# Patient Record
Sex: FEMALE | Race: WHITE | Employment: FULL TIME | URBAN - METROPOLITAN AREA
[De-identification: names, ages, dates, MRNs, and addresses within clinical notes are randomized per-mention and may not be internally consistent; named-entity substitution may affect disease eponyms.]

---

## 2019-05-21 ENCOUNTER — HOSPITAL ENCOUNTER (EMERGENCY)
Facility: CLINIC | Age: 34
Discharge: HOME | End: 2019-05-22
Attending: EMERGENCY MEDICINE | Admitting: EMERGENCY MEDICINE
Payer: OTHER MISCELLANEOUS

## 2019-05-21 ENCOUNTER — APPOINTMENT (OUTPATIENT)
Dept: GENERAL RADIOLOGY | Facility: CLINIC | Age: 34
End: 2019-05-21
Attending: NURSE PRACTITIONER
Payer: OTHER MISCELLANEOUS

## 2019-05-21 DIAGNOSIS — S46.912A LEFT SHOULDER STRAIN, INITIAL ENCOUNTER: ICD-10-CM

## 2019-05-21 PROCEDURE — 99283 EMERGENCY DEPT VISIT LOW MDM: CPT

## 2019-05-21 PROCEDURE — 25000132 ZZH RX MED GY IP 250 OP 250 PS 637: Performed by: EMERGENCY MEDICINE

## 2019-05-21 PROCEDURE — 73030 X-RAY EXAM OF SHOULDER: CPT | Mod: LT

## 2019-05-21 RX ORDER — DEXTROAMPHETAMINE SACCHARATE, AMPHETAMINE ASPARTATE, DEXTROAMPHETAMINE SULFATE AND AMPHETAMINE SULFATE 5; 5; 5; 5 MG/1; MG/1; MG/1; MG/1
20 TABLET ORAL 2 TIMES DAILY
COMMUNITY

## 2019-05-21 RX ORDER — CETIRIZINE HYDROCHLORIDE 10 MG/1
10 TABLET ORAL DAILY
COMMUNITY

## 2019-05-21 RX ORDER — IBUPROFEN 600 MG/1
600 TABLET, FILM COATED ORAL ONCE
Status: COMPLETED | OUTPATIENT
Start: 2019-05-21 | End: 2019-05-21

## 2019-05-21 RX ADMIN — IBUPROFEN 600 MG: 600 TABLET ORAL at 23:23

## 2019-05-21 ASSESSMENT — MIFFLIN-ST. JEOR: SCORE: 1595.39

## 2019-05-21 ASSESSMENT — ENCOUNTER SYMPTOMS: ARTHRALGIAS: 1

## 2019-05-21 NOTE — LETTER
May 21, 2019      To Whom It May Concern:      Becky Coello was seen in our Emergency Department today, 05/21/19.  I expect her condition to improve over the next 4 days.  She may return to work on 5/22/19 with light duty for 4 days of no lifting over 10 pounds and no use of her left shoulder (wear sling for 4 days)    Sincerely,        Coco Garcia MD

## 2019-05-21 NOTE — ED AVS SNAPSHOT
Emergency Department  [address] [place] [id number]  Phone:  [phone]  Fax:  [phone]                                    Becky Coello   MRN: [id number]    Department:   Emergency Department   Date of Visit:  5/21/2019           After Visit Summary Signature Page    I have received my discharge instructions, and my questions have been answered. I have discussed any challenges I see with this plan with the nurse or doctor.    ..........................................................................................................................................  Patient/Patient Representative Signature      ..........................................................................................................................................  Patient Representative Print Name and Relationship to Patient    ..................................................               ................................................  Date                                   Time    ..........................................................................................................................................  Reviewed by Signature/Title    ...................................................              ..............................................  Date                                               Time          22EPIC Rev 08/18

## 2019-05-22 VITALS
HEART RATE: 91 BPM | SYSTOLIC BLOOD PRESSURE: 134 MMHG | DIASTOLIC BLOOD PRESSURE: 63 MMHG | HEIGHT: 65 IN | RESPIRATION RATE: 18 BRPM | TEMPERATURE: 98.7 F | OXYGEN SATURATION: 99 % | WEIGHT: 195 LBS | BODY MASS INDEX: 32.49 KG/M2

## 2019-05-22 ASSESSMENT — ENCOUNTER SYMPTOMS
CHEST TIGHTNESS: 0
NUMBNESS: 0
JOINT SWELLING: 0
FEVER: 0
NECK PAIN: 0
COUGH: 0

## 2019-05-22 NOTE — ED PROVIDER NOTES
"  History     Chief Complaint:  Left shoulder pain    HPI   Becky Coello is a 33 year old female, right hand dominant, who presents with left shoulder pain. Patient states at 1950, she was at work and pulling down at an angle on a heavy shock which jerked her left arm down. She describes the pain in her left shoulder as a burning sensation which worsens upon elevation of her left arm above shoulder height. She reports being able to pull but not push without feeling the burning sensation. She further reports popping with rotation of the shoulder. Of note, she has dislocated her left shoulder twice since December 2018 but reports today's symptoms are different with less pain and greater range of motion than p.       Allergies:  Advair Diskus      Medications:    Adderall  Zyrtec     Past Medical History:    Left shoulder dislocation    Past Surgical History:    The patient does not have any pertinent past surgical history.    Family History:    No past pertinent family history.    Social History:  Presents alone.  Current every day smoker, 0.5 ppd.  Positive for alcohol use.    Incident occurred at work, works at [employer].     Review of Systems   Constitutional: Negative for fever.   Respiratory: Negative for cough and chest tightness.    Musculoskeletal: Positive for arthralgias. Negative for joint swelling and neck pain.   Neurological: Negative for numbness.     Physical Exam     Patient Vitals for the past 24 hrs:   BP Temp Temp src Pulse Resp SpO2 Height Weight   05/21/19 2119 134/63 98.7  F (37.1  C) Oral 91 16 99 % 1.651 m (5' 5\") 88.5 kg (195 lb)     Physical Exam  Nursing note and vitals reviewed.  Constitutional:  Appears well-developed and well-nourished.   HENT:   Head:    Atraumatic.    Eyes:    Pupils are equal, round, and reactive to light.   Neck:    Normal range of motion. Neck supple.      No tracheal deviation present. No thyromegaly present.   Cardiovascular:  Normal rate, regular rhythm, no " murmur   Pulmonary/Chest: Breath sounds are clear and equal without wheezes or crackles.  Musculoskeletal:  Left arm exam: mild tenderness to the left AC joint, no deformity or swelling to the shoulder. Good ROM to the shoulder. Pain with abduction and extension. Good radial pulse. Normal sensation distally.  Neurological:   Alert and oriented to person, place, and time.   Skin:    Skin is warm and dry. No rash noted. No pallor.     Emergency Department Course     Imaging:  Radiographic findings were communicated with the patient who voiced understanding of the findings.    XR Shoulder Left G/E 3 Views:  Negative. As per radiology.    Interventions:  2323 Ibuprofen 600 mg PO    Emergency Department Course:  Nursing notes and vitals reviewed. 2310 I performed an exam of the patient as documented above.     Medicine administered as documented above.     The patient was sent for a XR Shoulder while in the emergency department, findings above.     2340 I rechecked the patient and discussed the results of her workup thus far.     Findings and plan explained to the Patient. Patient discharged home with instructions regarding supportive care, medications, and reasons to return. The importance of close follow-up was reviewed.     Impression & Plan      Medical Decision Making:  Becky Coello is a 33 year old female who I found to have a shoulder injury, with likely shoulder strain/sprain injury. I also considered possibly rotator cuff tear. This was a soft tissue injury but no fracture or dislocation on her X-rays, and she is neurologically normal, so I felt she could be safely discharged to home. She was placed in a sling and told to only wear it for 4 days and then stop using it and take ibuprofen as needed for pain and follow up with orthopedic surgery.     Diagnosis:    ICD-10-CM   1. Left shoulder strain, initial encounter S46.912A       Disposition:  discharged to home    IAdán, am serving as a scribe on  5/21/2019 at 11:31 PM to personally document services performed by Coco Garcia MD based on my observations and the provider's statements to me.     I, Samra Vernon, am serving as a scribe on 5/21/2019 at 11:31 PM to personally document services performed by Coco Garcia MD based on my observations and the provider's statements to me.     Samra Vernon  5/21/2019    EMERGENCY DEPARTMENT       Coco Garcia MD  05/22/19 0141

## 2021-06-11 DIAGNOSIS — Z11.59 ENCOUNTER FOR SCREENING FOR OTHER VIRAL DISEASES: ICD-10-CM

## 2021-07-14 ENCOUNTER — HOSPITAL ENCOUNTER (OUTPATIENT)
Dept: GENERAL RADIOLOGY | Facility: CLINIC | Age: 36
Discharge: HOME | End: 2021-07-14
Attending: ORTHOPAEDIC SURGERY | Admitting: ORTHOPAEDIC SURGERY
Payer: OTHER MISCELLANEOUS

## 2021-07-14 ENCOUNTER — TELEPHONE (OUTPATIENT)
Dept: INTERVENTIONAL RADIOLOGY/VASCULAR | Facility: CLINIC | Age: 36
End: 2021-07-14

## 2021-07-14 VITALS — DIASTOLIC BLOOD PRESSURE: 67 MMHG | HEART RATE: 103 BPM | SYSTOLIC BLOOD PRESSURE: 121 MMHG

## 2021-07-14 DIAGNOSIS — M25.551 RIGHT HIP PAIN: ICD-10-CM

## 2021-07-14 PROCEDURE — 250N000011 HC RX IP 250 OP 636

## 2021-07-14 PROCEDURE — 27096 INJECT SACROILIAC JOINT: CPT | Mod: RT

## 2021-07-14 PROCEDURE — 250N000009 HC RX 250: Performed by: ORTHOPAEDIC SURGERY

## 2021-07-14 PROCEDURE — 96372 THER/PROPH/DIAG INJ SC/IM: CPT | Performed by: ORTHOPAEDIC SURGERY

## 2021-07-14 PROCEDURE — 255N000002 HC RX 255 OP 636: Performed by: PHYSICIAN ASSISTANT

## 2021-07-14 PROCEDURE — 250N000009 HC RX 250: Performed by: PHYSICIAN ASSISTANT

## 2021-07-14 PROCEDURE — 250N000011 HC RX IP 250 OP 636: Performed by: ORTHOPAEDIC SURGERY

## 2021-07-14 PROCEDURE — 20610 DRAIN/INJ JOINT/BURSA W/O US: CPT | Mod: RT

## 2021-07-14 RX ORDER — LIDOCAINE HYDROCHLORIDE 10 MG/ML
INJECTION, SOLUTION EPIDURAL; INFILTRATION; INTRACAUDAL; PERINEURAL
Status: DISCONTINUED
Start: 2021-07-14 | End: 2021-07-15 | Stop reason: HOSPADM

## 2021-07-14 RX ORDER — IOPAMIDOL 408 MG/ML
10 INJECTION, SOLUTION INTRATHECAL ONCE
Status: COMPLETED | OUTPATIENT
Start: 2021-07-14 | End: 2021-07-14

## 2021-07-14 RX ORDER — BUPIVACAINE HYDROCHLORIDE 5 MG/ML
30 INJECTION, SOLUTION EPIDURAL; INTRACAUDAL ONCE
Status: COMPLETED | OUTPATIENT
Start: 2021-07-14 | End: 2021-07-14

## 2021-07-14 RX ORDER — TRIAMCINOLONE ACETONIDE 40 MG/ML
80 INJECTION, SUSPENSION INTRA-ARTICULAR; INTRAMUSCULAR ONCE
Status: DISCONTINUED | OUTPATIENT
Start: 2021-07-14 | End: 2021-07-15 | Stop reason: HOSPADM

## 2021-07-14 RX ORDER — TRIAMCINOLONE ACETONIDE 40 MG/ML
40 INJECTION, SUSPENSION INTRA-ARTICULAR; INTRAMUSCULAR ONCE
Status: COMPLETED | OUTPATIENT
Start: 2021-07-14 | End: 2021-07-14

## 2021-07-14 RX ORDER — LIDOCAINE HYDROCHLORIDE 10 MG/ML
10 INJECTION, SOLUTION EPIDURAL; INFILTRATION; INTRACAUDAL; PERINEURAL ONCE
Status: COMPLETED | OUTPATIENT
Start: 2021-07-14 | End: 2021-07-14

## 2021-07-14 RX ORDER — TRIAMCINOLONE ACETONIDE 40 MG/ML
INJECTION, SUSPENSION INTRA-ARTICULAR; INTRAMUSCULAR
Status: COMPLETED
Start: 2021-07-14 | End: 2021-07-14

## 2021-07-14 RX ORDER — BUPIVACAINE HYDROCHLORIDE 5 MG/ML
INJECTION, SOLUTION EPIDURAL; INTRACAUDAL
Status: DISCONTINUED
Start: 2021-07-14 | End: 2021-07-15 | Stop reason: HOSPADM

## 2021-07-14 RX ORDER — BUPIVACAINE HYDROCHLORIDE 5 MG/ML
8 INJECTION, SOLUTION EPIDURAL; INTRACAUDAL ONCE
Status: COMPLETED | OUTPATIENT
Start: 2021-07-14 | End: 2021-07-14

## 2021-07-14 RX ADMIN — TRIAMCINOLONE ACETONIDE 40 MG: 40 INJECTION, SUSPENSION INTRA-ARTICULAR; INTRAMUSCULAR at 16:35

## 2021-07-14 RX ADMIN — TRIAMCINOLONE ACETONIDE 40 MG: 40 INJECTION, SUSPENSION INTRA-ARTICULAR; INTRAMUSCULAR at 16:27

## 2021-07-14 RX ADMIN — IOPAMIDOL 2 ML: 408 INJECTION, SOLUTION INTRATHECAL at 16:29

## 2021-07-14 RX ADMIN — LIDOCAINE HYDROCHLORIDE 10 ML: 10 INJECTION, SOLUTION EPIDURAL; INFILTRATION; INTRACAUDAL; PERINEURAL at 16:36

## 2021-07-14 RX ADMIN — LIDOCAINE HYDROCHLORIDE 5 ML: 10 INJECTION, SOLUTION EPIDURAL; INFILTRATION; INTRACAUDAL; PERINEURAL at 16:26

## 2021-07-14 RX ADMIN — BUPIVACAINE HYDROCHLORIDE 2 ML: 5 INJECTION, SOLUTION EPIDURAL; INTRACAUDAL; PERINEURAL at 16:32

## 2021-07-14 RX ADMIN — BUPIVACAINE HYDROCHLORIDE 2 ML: 5 INJECTION, SOLUTION EPIDURAL; INTRACAUDAL; PERINEURAL at 16:29

## 2021-07-14 NOTE — PROGRESS NOTES
Pt was in Radiology today for a Right sacroiliac joint and right greater trochanteric joints steroid injections. Pt tolerated ortho procedure well. Pre procedure pain was 7 post procedure pain level 4-5 at SI region and 0 in hip. Procedure was completed by [facility]. There were no complications during this procedure. Pt verbalized understanding of written and verbal instructions and left department in stable and satisfactory condition with . There is no evidence of bleeding or any other complications upon discharge.

## 2021-07-14 NOTE — TELEPHONE ENCOUNTER
Becky Matson was seen at [facility] in [place] for several medical procedures today 7/14/21. After the procedure she should be off work the remainder of the day and then light duty for 3 days. At that time if she is feeling better she may go back to full duty. If any questions please call Radiology Nurses at [phone], thanks Dimitry Cat RN and TORIBIO SERVIN

## 2021-07-14 NOTE — PROGRESS NOTES
RADIOLOGY PROCEDURE NOTE  Patient name: Becky Matson  MRN: [id number]  : 1985    Pre-procedure diagnosis: Right hip and buttocks pain.  Post-procedure diagnosis: Same    Procedure Date/Time: 2021  4:17 PM  Procedure:Right SI and Right greater trochanteric joint steroid injection  Estimated blood loss: None  Specimen(s) collected with description: none  The patient tolerated the procedure well with no immediate complications.  Significant findings:none    See imaging dictation for procedural details.    Provider name: Álvaro Hardin PA-C  Assistant(s):None